# Patient Record
Sex: MALE | Race: WHITE | NOT HISPANIC OR LATINO | Employment: FULL TIME | URBAN - METROPOLITAN AREA
[De-identification: names, ages, dates, MRNs, and addresses within clinical notes are randomized per-mention and may not be internally consistent; named-entity substitution may affect disease eponyms.]

---

## 2017-10-30 ENCOUNTER — OFFICE VISIT (OUTPATIENT)
Dept: MEDICAL GROUP | Facility: MEDICAL CENTER | Age: 57
End: 2017-10-30

## 2017-10-30 VITALS
OXYGEN SATURATION: 97 % | WEIGHT: 201 LBS | SYSTOLIC BLOOD PRESSURE: 130 MMHG | HEIGHT: 69 IN | HEART RATE: 78 BPM | BODY MASS INDEX: 29.77 KG/M2 | TEMPERATURE: 97 F | DIASTOLIC BLOOD PRESSURE: 78 MMHG | RESPIRATION RATE: 16 BRPM

## 2017-10-30 DIAGNOSIS — Z00.00 ANNUAL PHYSICAL EXAM: ICD-10-CM

## 2017-10-30 DIAGNOSIS — A60.01 HERPES SIMPLEX INFECTION OF PENIS: ICD-10-CM

## 2017-10-30 DIAGNOSIS — B00.9 HERPES SIMPLEX DISEASE: ICD-10-CM

## 2017-10-30 DIAGNOSIS — A41.01 MSSA (METHICILLIN SUSCEPTIBLE STAPHYLOCOCCUS AUREUS) SEPTICEMIA (HCC): ICD-10-CM

## 2017-10-30 DIAGNOSIS — Z23 NEED FOR VACCINATION: ICD-10-CM

## 2017-10-30 DIAGNOSIS — Z12.11 SCREEN FOR COLON CANCER: ICD-10-CM

## 2017-10-30 PROCEDURE — 90686 IIV4 VACC NO PRSV 0.5 ML IM: CPT | Performed by: NURSE PRACTITIONER

## 2017-10-30 PROCEDURE — 99396 PREV VISIT EST AGE 40-64: CPT | Mod: 25 | Performed by: NURSE PRACTITIONER

## 2017-10-30 PROCEDURE — 90471 IMMUNIZATION ADMIN: CPT | Performed by: NURSE PRACTITIONER

## 2017-10-30 RX ORDER — VALACYCLOVIR HYDROCHLORIDE 1 G/1
500 TABLET, FILM COATED ORAL
Qty: 90 TAB | Refills: 1 | Status: SHIPPED | OUTPATIENT
Start: 2017-10-30 | End: 2023-02-28

## 2017-10-31 NOTE — PROGRESS NOTES
Ifeanyi Zambrano is a 57 y.o. male here to establish care and for refill on Valtrex.    HPI:    MSSA (methicillin susceptible Staphylococcus aureus) septicemia  In 2013. Has not had any issues since.     Herpes simplex infection of penis  Has not had an outbreak in many years. He takes 500 mg of Valtrex for 7 days when he feels an outbreak coming.     Annual physical exam  Social/Family:   Work:Owns a pest control company  Diet: Meat, Vegetables, tries to stay away from processed foods.   Exercise: Minimal exercise.      Current medicines (including changes today)  Current Outpatient Prescriptions   Medication Sig Dispense Refill   • valacyclovir (VALTREX) 1 GM Tab Take 0.5 Tabs by mouth every day. During outbreaks. 90 Tab 1   • Zoster Vaccine Live (ZOSTAVAX) 30360 UNT/0.65ML Recon Susp Inject 0.65 mL as instructed Once for 1 dose. 0.65 mL 0   • aspirin EC (ECOTRIN) 81 MG TBEC Take 1 Tab by mouth every day.     • Cholecalciferol (VITAMIN D) 2000 UNITS CAPS Take 1 Cap by mouth every day.     • NON SPECIFIED Take 2 Each by mouth every day. Omega 3 Fish Oil (735 EPA/ 165 DHA)       No current facility-administered medications for this visit.      He  has a past medical history of Allergy; Fall; and Heart murmur.  He  has a past surgical history that includes other and hernia repair (1965).  Social History   Substance Use Topics   • Smoking status: Never Smoker   • Smokeless tobacco: Never Used   • Alcohol use Yes      Comment: 3 x beers/week     Social History     Social History Narrative   • No narrative on file     Family History   Problem Relation Age of Onset   • Arthritis Mother    • Hypertension Mother    • Hypertension Father      Family Status   Relation Status   • Mother Alive   • Father Alive   • Sister Alive   • Brother Alive         ROS  No chest pain, no abdominal pain, no rash.  All other systems reviewed and are negative      Objective:     Blood pressure 130/78, pulse 78, temperature 36.1 °C (97 °F),  "resp. rate 16, height 1.753 m (5' 9\"), weight 91.2 kg (201 lb), SpO2 97 %. Body mass index is 29.68 kg/m².  Physical Exam:    Constitutional: Alert, no distress.  Skin: Warm, dry, good turgor, no rashes in visible areas.  Eye: Equal, round and reactive, conjunctiva clear, lids normal.  ENMT: Lips without lesions, good dentition, oropharynx clear.  Neck: Trachea midline, no masses, no thyromegaly. No cervical or supraclavicular lymphadenopathy.  Respiratory: Unlabored respiratory effort, lungs clear to auscultation, no wheezes, no ronchi.  Cardiovascular: Normal S1, S2, no murmur, no edema.  Abdomen: Soft, non-tender, no masses, no hepatosplenomegaly.  Psych: Alert and oriented x3, normal affect and mood.        Assessment and Plan:   The following treatment plan was discussed    1. Annual physical exam  Patient is self pay only, states he cannot afford to get several labs checked. Agreeable to get lipid panel and chemistry panel checked.  Encouraged patient to increase exercise frequency and maintain a healthy diet.   Check labs, call with results.  - LIPID PROFILE; Future  - COMP METABOLIC PANEL; Future    2. Herpes simplex disease  Stable.  Continue Valtrex 500 mg daily for 3 days for outbreak.   - valacyclovir (VALTREX) 1 GM Tab; Take 0.5 Tabs by mouth every day. During outbreaks.  Dispense: 90 Tab; Refill: 1    3. Need for vaccination  Immunized in office.   Prescription for Zoster vaccine given per patient request.   - Zoster Vaccine Live (ZOSTAVAX) 85834 UNT/0.65ML Recon Susp; Inject 0.65 mL as instructed Once for 1 dose.  Dispense: 0.65 mL; Refill: 0  -Influenza Vaccine      Records requested.  Followup: Return in about 1 year (around 10/30/2018), or if symptoms worsen or fail to improve.    I have placed the below orders and discussed them with an approved delegating provider. The MA is performing the below orders under the direction of Dr. Carrera           "

## 2017-10-31 NOTE — ASSESSMENT & PLAN NOTE
Has not had an outbreak in many years. He takes 500 mg of Valtrex for 7 days when he feels an outbreak coming.

## 2017-10-31 NOTE — ASSESSMENT & PLAN NOTE
Social/Family:   Work:Owns a pest control company  Diet: Meat, Vegetables, tries to stay away from processed foods.   Exercise: Minimal exercise.

## 2023-01-09 ENCOUNTER — TELEPHONE (OUTPATIENT)
Dept: SCHEDULING | Facility: IMAGING CENTER | Age: 63
End: 2023-01-09

## 2023-02-13 ENCOUNTER — OFFICE VISIT (OUTPATIENT)
Dept: MEDICAL GROUP | Facility: PHYSICIAN GROUP | Age: 63
End: 2023-02-13
Payer: MEDICARE

## 2023-02-13 VITALS
WEIGHT: 200 LBS | RESPIRATION RATE: 16 BRPM | SYSTOLIC BLOOD PRESSURE: 138 MMHG | TEMPERATURE: 97.9 F | HEIGHT: 69 IN | DIASTOLIC BLOOD PRESSURE: 82 MMHG | OXYGEN SATURATION: 98 % | HEART RATE: 67 BPM | BODY MASS INDEX: 29.62 KG/M2

## 2023-02-13 DIAGNOSIS — Z12.12 SCREENING FOR COLORECTAL CANCER: ICD-10-CM

## 2023-02-13 DIAGNOSIS — M65.332 ACQUIRED TRIGGER FINGER OF LEFT MIDDLE FINGER: ICD-10-CM

## 2023-02-13 DIAGNOSIS — Z12.5 ENCOUNTER FOR SCREENING FOR MALIGNANT NEOPLASM OF PROSTATE: ICD-10-CM

## 2023-02-13 DIAGNOSIS — Z23 NEED FOR VACCINATION: ICD-10-CM

## 2023-02-13 DIAGNOSIS — Z12.11 SCREENING FOR COLORECTAL CANCER: ICD-10-CM

## 2023-02-13 DIAGNOSIS — Z00.00 WELL ADULT EXAM: ICD-10-CM

## 2023-02-13 DIAGNOSIS — M67.911 DISORDER OF RIGHT ROTATOR CUFF: ICD-10-CM

## 2023-02-13 PROCEDURE — G0009 ADMIN PNEUMOCOCCAL VACCINE: HCPCS | Performed by: FAMILY MEDICINE

## 2023-02-13 PROCEDURE — 99203 OFFICE O/P NEW LOW 30 MIN: CPT | Mod: 25 | Performed by: FAMILY MEDICINE

## 2023-02-13 PROCEDURE — 90677 PCV20 VACCINE IM: CPT | Performed by: FAMILY MEDICINE

## 2023-02-13 ASSESSMENT — ENCOUNTER SYMPTOMS
HEARTBURN: 0
RESPIRATORY NEGATIVE: 1
HEMOPTYSIS: 0
FEVER: 0
COUGH: 0
CHILLS: 0
GASTROINTESTINAL NEGATIVE: 1
PSYCHIATRIC NEGATIVE: 1
CARDIOVASCULAR NEGATIVE: 1
HEADACHES: 0
DEPRESSION: 0
MYALGIAS: 0
NEUROLOGICAL NEGATIVE: 1
BLURRED VISION: 0
PALPITATIONS: 0
DOUBLE VISION: 0
TINGLING: 0
BRUISES/BLEEDS EASILY: 0
NAUSEA: 0
CONSTITUTIONAL NEGATIVE: 1
EYES NEGATIVE: 1
DIZZINESS: 0

## 2023-02-13 ASSESSMENT — PATIENT HEALTH QUESTIONNAIRE - PHQ9: CLINICAL INTERPRETATION OF PHQ2 SCORE: 0

## 2023-02-13 NOTE — PROGRESS NOTES
Subjective     Mario Zambrano is a 63 y.o. male who presents with Establish Care (Arthritis )            1. Well adult exam  This patient was here for a preventative medicine visit today. The Health Maintenance issues that are appropriate for this patient's age and sex were discussed and any that they agreed to were ordered. The patient was also give age and sex appropriate counseling for preventative matters such as diet, exercise, medication usage, and social determinants.       Referral to GI for Colonoscopy   Comp Metabolic Panel; Future   Lipid Profile; Future   CBC WITHOUT DIFFERENTIAL; Future   PROSTATE SPECIFIC AG SCREENING; Future    2. Need for vaccination     Pneumococcal Conjugate Vaccine 20-Valent (19 yrs+)    3. Screening for colorectal cancer      4. Encounter for screening for malignant neoplasm of prostate     PROSTATE SPECIFIC AG SCREENING; Future    5. Disorder of right rotator cuff  Fall and pain and lack of rom in the right shoulder, on exam 90 degrees of abduction before pain   Referral to Orthopedics    6. Acquired trigger finger of left middle finger  Left middle finger with locking and popping   Referral to Orthopedics    Past Medical History:  No date: Allergy  No date: Fall  No date: Heart murmur  Past Surgical History:  1965: HERNIA REPAIR  No date: OTHER  Social History    Tobacco Use      Smoking status: Never      Smokeless tobacco: Never    Alcohol use: Yes      Comment: 3 x beers/week    Drug use: No    Review of patient's family history indicates:      Current Outpatient Medications: ·  Apoaequorin (PREVAGEN PO), Take  by mouth., Disp: , Rfl: ·  NON SPECIFIED, Take 2 Each by mouth every day. Omega 3 Fish Oil (735 EPA/ 165 DHA), Disp: , Rfl: ·  meloxicam (MOBIC) 15 MG tablet, Take 1 Tablet by mouth every day. (Patient not taking: Reported on 2/13/2023), Disp: 30 Tablet, Rfl: 0·  valacyclovir (VALTREX) 1 GM Tab, Take 0.5 Tabs by mouth every day. During outbreaks. (Patient not  "taking: Reported on 2/13/2023), Disp: 90 Tab, Rfl: 1·  aspirin EC (ECOTRIN) 81 MG TBEC, Take 1 Tab by mouth every day. (Patient not taking: Reported on 2/13/2023), Disp: , Rfl: ·  Cholecalciferol (VITAMIN D) 2000 UNITS CAPS, Take 1 Cap by mouth every day. (Patient not taking: Reported on 2/13/2023), Disp: , Rfl:     Patient was instructed on the use of medications, either prescriptions or OTC and informed on when the appropriate follow up time period should be. In addition, patient was also instructed that should any acute worsening occur that they should notify this clinic asap or call 911.            Review of Systems   Constitutional: Negative.  Negative for chills and fever.   HENT: Negative.  Negative for hearing loss.    Eyes: Negative.  Negative for blurred vision and double vision.   Respiratory: Negative.  Negative for cough and hemoptysis.    Cardiovascular: Negative.  Negative for chest pain and palpitations.   Gastrointestinal: Negative.  Negative for heartburn and nausea.   Genitourinary: Negative.  Negative for dysuria.   Musculoskeletal:  Positive for joint pain. Negative for myalgias.   Skin: Negative.  Negative for rash.   Neurological: Negative.  Negative for dizziness, tingling and headaches.   Endo/Heme/Allergies: Negative.  Does not bruise/bleed easily.   Psychiatric/Behavioral: Negative.  Negative for depression and suicidal ideas.    All other systems reviewed and are negative.           Objective     /82 (BP Location: Right arm, Patient Position: Sitting, BP Cuff Size: Adult)   Pulse 67   Temp 36.6 °C (97.9 °F) (Temporal)   Resp 16   Ht 1.753 m (5' 9\")   Wt 90.7 kg (200 lb)   SpO2 98%   BMI 29.53 kg/m²      Physical Exam  Vitals and nursing note reviewed.   Constitutional:       General: He is not in acute distress.     Appearance: He is well-developed. He is not diaphoretic.   HENT:      Head: Normocephalic and atraumatic.      Mouth/Throat:      Pharynx: No oropharyngeal " exudate.   Eyes:      Pupils: Pupils are equal, round, and reactive to light.   Cardiovascular:      Rate and Rhythm: Normal rate and regular rhythm.      Heart sounds: Normal heart sounds. No murmur heard.    No friction rub. No gallop.   Pulmonary:      Effort: Pulmonary effort is normal. No respiratory distress.      Breath sounds: Normal breath sounds. No wheezing or rales.   Chest:      Chest wall: No tenderness.   Musculoskeletal:      Right shoulder: Decreased range of motion.      Left hand: Tenderness present. Decreased range of motion.   Neurological:      Mental Status: He is alert and oriented to person, place, and time.   Psychiatric:         Behavior: Behavior normal.         Thought Content: Thought content normal.         Judgment: Judgment normal.                           Assessment & Plan        1. Well adult exam    - Referral to GI for Colonoscopy  - Comp Metabolic Panel; Future  - Lipid Profile; Future  - CBC WITHOUT DIFFERENTIAL; Future  - PROSTATE SPECIFIC AG SCREENING; Future    2. Need for vaccination    - Pneumococcal Conjugate Vaccine 20-Valent (19 yrs+)    3. Screening for colorectal cancer      4. Encounter for screening for malignant neoplasm of prostate    - PROSTATE SPECIFIC AG SCREENING; Future    5. Disorder of right rotator cuff    - Referral to Orthopedics    6. Acquired trigger finger of left middle finger    - Referral to Orthopedics

## 2023-02-28 ENCOUNTER — TELEPHONE (OUTPATIENT)
Dept: HEALTH INFORMATION MANAGEMENT | Facility: OTHER | Age: 63
End: 2023-02-28
Payer: MEDICARE

## 2023-02-28 PROBLEM — M75.41 IMPINGEMENT SYNDROME OF RIGHT SHOULDER: Status: ACTIVE | Noted: 2023-02-28

## 2023-02-28 PROBLEM — M75.21 BICIPITAL TENDINITIS OF RIGHT SHOULDER: Status: ACTIVE | Noted: 2023-02-28

## 2023-02-28 PROBLEM — S46.011A STRAIN OF TENDON OF RIGHT ROTATOR CUFF: Status: ACTIVE | Noted: 2023-02-28

## 2023-10-11 ENCOUNTER — OFFICE VISIT (OUTPATIENT)
Dept: MEDICAL GROUP | Facility: PHYSICIAN GROUP | Age: 63
End: 2023-10-11
Payer: MEDICARE

## 2023-10-11 VITALS
WEIGHT: 196 LBS | TEMPERATURE: 98.4 F | SYSTOLIC BLOOD PRESSURE: 122 MMHG | RESPIRATION RATE: 18 BRPM | HEART RATE: 92 BPM | OXYGEN SATURATION: 93 % | HEIGHT: 70 IN | DIASTOLIC BLOOD PRESSURE: 80 MMHG | BODY MASS INDEX: 28.06 KG/M2

## 2023-10-11 DIAGNOSIS — R05.9 COUGH, UNSPECIFIED TYPE: Primary | ICD-10-CM

## 2023-10-11 PROCEDURE — 99999 PR NO CHARGE: CPT | Performed by: FAMILY MEDICINE

## 2023-10-18 ENCOUNTER — OFFICE VISIT (OUTPATIENT)
Dept: MEDICAL GROUP | Facility: PHYSICIAN GROUP | Age: 63
End: 2023-10-18
Payer: MEDICARE

## 2023-10-18 VITALS
SYSTOLIC BLOOD PRESSURE: 134 MMHG | RESPIRATION RATE: 16 BRPM | HEART RATE: 70 BPM | HEIGHT: 70 IN | DIASTOLIC BLOOD PRESSURE: 80 MMHG | OXYGEN SATURATION: 96 % | TEMPERATURE: 96.6 F | BODY MASS INDEX: 27.49 KG/M2 | WEIGHT: 192 LBS

## 2023-10-18 DIAGNOSIS — E78.2 MIXED HYPERLIPIDEMIA: ICD-10-CM

## 2023-10-18 DIAGNOSIS — N63.42 SUBAREOLAR MASS OF LEFT BREAST: ICD-10-CM

## 2023-10-18 DIAGNOSIS — R73.09 ELEVATED GLUCOSE: ICD-10-CM

## 2023-10-18 DIAGNOSIS — Z12.11 SCREENING FOR COLON CANCER: ICD-10-CM

## 2023-10-18 PROCEDURE — 3079F DIAST BP 80-89 MM HG: CPT | Performed by: PHYSICIAN ASSISTANT

## 2023-10-18 PROCEDURE — 3075F SYST BP GE 130 - 139MM HG: CPT | Performed by: PHYSICIAN ASSISTANT

## 2023-10-18 PROCEDURE — 99214 OFFICE O/P EST MOD 30 MIN: CPT | Performed by: PHYSICIAN ASSISTANT

## 2023-10-18 NOTE — PROGRESS NOTES
CC:    Chief Complaint   Patient presents with    [facility]    Cyst     Left side        HISTORY OF THE PRESENT ILLNESS: Patient is a 63 y.o. male presenting to establish primary care     Pt has bump over his L nipple that started about a month ago.   Pt has several orthopedic conditions and is followed by [facility].     No problem-specific Assessment & Plan notes found for this encounter.    Allergies: Pcn [penicillins]    Current Outpatient Medications Ordered in Epic   Medication Sig Dispense Refill    ondansetron (ZOFRAN) 4 MG Tab tablet Take 1 Tablet by mouth every four hours as needed for Nausea/Vomiting. (Patient not taking: Reported on 10/11/2023) 15 Tablet 0    Apoaequorin (PREVAGEN PO) Take  by mouth. (Patient not taking: Reported on 10/18/2023)      NON SPECIFIED Take 2 Each by mouth every day. Omega 3 Fish Oil (735 EPA/ 165 DHA) (Patient not taking: Reported on 10/11/2023)       No current [facility]-ordered facility-administered medications on file.       Past Medical History:   Diagnosis Date    Allergy     Fall     Heart murmur        Past Surgical History:   Procedure Laterality Date    PB SHLDR ARTHROSCOP,SURG,W/ROTAT CUFF REPB Right 3/20/2023    Procedure: RIGHT SHOULDER ARTHROSCOPY ROTATOR CUFF REPAIR RIGHT SUBACROMIAL DECOMPRESSION RIGHT EXTENSIVE JOINT DEBRIDEMENT RIGHT BICEP TENODESIS, REPAIRS AS INDICATED;  Surgeon: Kt Del Toro M.D.;  Location: [facility];  Service: Orthopedics    HERNIA REPAIR  1965    OTHER         Social History     Tobacco Use    Smoking status: Never    Smokeless tobacco: Never   Vaping Use    Vaping Use: Never used   Substance Use Topics    Alcohol use: Yes     Comment: 3 x beers/week    Drug use: No       Social History     Social History Narrative    Works in pest control       Family History   Problem Relation Age of Onset    Arthritis Mother     Hypertension Mother     Hypertension Father        ROS:     - Constitutional: Negative for fever, chills,  "unexpected weight change, and fatigue/generalized weakness.     - HEENT: Negative for headaches, vision changes, hearing changes, ear pain, ear discharge, rhinorrhea, sinus congestion, sore throat, and neck pain.      - Respiratory: Negative for cough, sputum production, chest congestion, dyspnea, wheezing, and crackles.      - Cardiovascular: Negative for chest pain, palpitations, orthopnea, and bilateral lower extremity edema.     - Gastrointestinal: Negative for heartburn, nausea, vomiting, abdominal pain, hematochezia, melena, diarrhea, constipation, and greasy/foul-smelling stools.     - Genitourinary: Negative for dysuria, polyuria, hematuria, pyuria, urinary urgency, and urinary incontinence.     - Musculoskeletal: Negative for myalgias, back pain, and joint pain.     - Skin: Negative for rash, itching, cyanotic skin color change.     - Neurological: Negative for dizziness, tingling, tremors, focal sensory deficit, focal weakness and headaches.     - Endo/Heme/Allergies: Does not bruise/bleed easily.     - Psychiatric/Behavioral: Negative for depression, suicidal/homicidal ideation and memory loss.            .      Exam: /80 (BP Location: Left arm, Patient Position: Sitting, BP Cuff Size: Adult)   Pulse 70   Temp 35.9 °C (96.6 °F) (Temporal)   Resp 16   Ht 1.778 m (5' 10\")   Wt 87.1 kg (192 lb)   SpO2 96%  Body mass index is 27.55 kg/m².    General: Normal appearing. No acute distress.  Skin: Warm and dry.  No obvious lesions.  HEENT: Normocephalic. Eyes conjunctiva clear lids without ptosis, ears normal shape and contour  Breast: Positive for 3cm L subareolar mass in L breast  Cardiovascular: Regular rate and rhythm without murmur.   Respiratory: Clear to auscultation bilaterally, no rhonchi wheezing or rales.  Neurologic: Grossly nonfocal, A&O x3, gait normal,  Musculoskeletal: No deformity or swelling.   Extremities: No extremity cyanosis, clubbing, or edema.  Psych: Normal mood and affect. " Judgment and insight is normal.    Please note that this dictation was created using voice recognition software. I have made every reasonable attempt to correct obvious errors, but I expect that there are errors of grammar and possibly content that I did not discover before finalizing the note.      Assessment/Plan  1. Screening for colon cancer    - COLOGUARD (FIT DNA)    2. Subareolar mass of left breast  Will get imaging and f/u with results. Pt would like to see surgeon to have removed.   - MA-DIAGNOSTIC MAMMO BILAT W/TOMOSYNTHESIS W/CAD; Future  - US-BREAST LIMITED-LEFT; Future  - Referral to General Surgery    3. Mixed hyperlipidemia  Labs printed  - CBC WITH DIFFERENTIAL; Future  - Comp Metabolic Panel; Future  - Lipid Profile; Future    4. Elevated glucose    - HEMOGLOBIN A1C; Future

## 2023-10-27 LAB — HBA1C MFR BLD: 5.7 % (ref ?–5.8)

## 2023-10-30 ENCOUNTER — HOSPITAL ENCOUNTER (OUTPATIENT)
Dept: RADIOLOGY | Facility: MEDICAL CENTER | Age: 63
End: 2023-10-30
Attending: PHYSICIAN ASSISTANT
Payer: MEDICARE

## 2023-10-30 DIAGNOSIS — N63.42 SUBAREOLAR MASS OF LEFT BREAST: ICD-10-CM

## 2023-10-30 PROCEDURE — 76642 ULTRASOUND BREAST LIMITED: CPT | Mod: LT

## 2023-10-30 PROCEDURE — G0279 TOMOSYNTHESIS, MAMMO: HCPCS

## 2023-11-02 ENCOUNTER — OFFICE VISIT (OUTPATIENT)
Dept: SURGERY | Facility: MEDICAL CENTER | Age: 63
End: 2023-11-02
Payer: MEDICARE

## 2023-11-02 VITALS
HEIGHT: 70 IN | SYSTOLIC BLOOD PRESSURE: 124 MMHG | BODY MASS INDEX: 28.49 KG/M2 | DIASTOLIC BLOOD PRESSURE: 80 MMHG | WEIGHT: 199 LBS | HEART RATE: 73 BPM | OXYGEN SATURATION: 94 % | TEMPERATURE: 98.4 F

## 2023-11-02 DIAGNOSIS — N62 GYNECOMASTIA: ICD-10-CM

## 2023-11-02 PROCEDURE — 3079F DIAST BP 80-89 MM HG: CPT | Performed by: SURGERY

## 2023-11-02 PROCEDURE — 99204 OFFICE O/P NEW MOD 45 MIN: CPT | Performed by: SURGERY

## 2023-11-02 PROCEDURE — 3074F SYST BP LT 130 MM HG: CPT | Performed by: SURGERY

## 2023-11-02 ASSESSMENT — ENCOUNTER SYMPTOMS
ENDOCRINE NEGATIVE: 1
MUSCULOSKELETAL NEGATIVE: 1
NEUROLOGICAL NEGATIVE: 1
HEMATOLOGIC/LYMPHATIC NEGATIVE: 1
GASTROINTESTINAL NEGATIVE: 1
RESPIRATORY NEGATIVE: 1
EYES NEGATIVE: 1
PSYCHIATRIC NEGATIVE: 1
CONSTITUTIONAL NEGATIVE: 1
CARDIOVASCULAR NEGATIVE: 1

## 2023-11-02 NOTE — PROGRESS NOTES
"Subjective     Mario Zambrano is a 63 y.o. male who presents for evaluation of left breast gynecomastia.  He reports that he started taking an over the counter supplement for \"testosterone boosting\" in August of 2023 and within 3 weeks noticed a lump behind the left nipple associated with \"perpetual nipple hardness.\"  The mass seems to wax and wane in size and firmness but is still present.  He hasn't taken the supplements for about 2-3 weeks now but the mass is still present.  He has not noticed any other masses including testicular masses.  The breast mass is tender when it is pressed upon (like when leaning over the side of his truck bed, etc).  The supplement contains: Fadogia Agrestis, Tongkat Ali, Turkesterone, horny goat weed, saw palmetto, Meca root, and trubilus terrastris, among others.    Routine self breast exams: No   Breast pain: Yes  Nipple discharge: No   Skin changes: No   Masses: Yes  Contour/nipple changes: Yes  Previous breast biopsy or surgery: No     Family history of cancer: Mother skin cancers (high radiation exposure as teenager at [place] nuclear test sites).    Lifetime (5-yr) breast cancer risk assessment calculation  BRCAPRO: 0.1 % (0.0 %)    Imaging  Bilateral diagnostic mammogram ([facility]) 10/30/2023:  Bilateral retroareolar parenchymal tissue, left greater than right, with no suspicious findings, BIRADS 2.  Diagnostic ultrasound ([facility]) 10/30/2023:  Bilateral retroareolar parenchymal tissue, left greater than right, with no suspicious findings, BIRADS 2.     Past Medical History   Past Medical History:   Diagnosis Date    Acute osteomyelitis of pelvic (pubic) region 8/21/2013    Allergy     Fall     Heart murmur     MSSA (methicillin susceptible Staphylococcus aureus) septicemia (HCC) 8/21/2013    IMO load March 2020       Surgical History  Past Surgical History:   Procedure Laterality Date    PB SHLDR ARTHROSCOP,SURG,W/ROTAT CUFF REPB Right 3/20/2023    Procedure: RIGHT SHOULDER " ARTHROSCOPY ROTATOR CUFF REPAIR RIGHT SUBACROMIAL DECOMPRESSION RIGHT EXTENSIVE JOINT DEBRIDEMENT RIGHT BICEP TENODESIS, REPAIRS AS INDICATED;  Surgeon: Kt Del Toro M.D.;  Location: [facility];  Service: Orthopedics    HERNIA REPAIR  1965    OTHER         Family History  Family History   Problem Relation Age of Onset    Arthritis Mother     Hypertension Mother     Hypertension Father        Social History  Social History     Socioeconomic History    Marital status:      Spouse name: Not on file    Number of children: Not on file    Years of education: Not on file    Highest education level: Not on file   Occupational History    Occupation: business owner pest control   Tobacco Use    Smoking status: Never    Smokeless tobacco: Never   Vaping Use    Vaping Use: Never used   Substance and Sexual Activity    Alcohol use: Yes     Comment: 3 drinks/ week    Drug use: No    Sexual activity: Yes     Partners: Female   Other Topics Concern    Not on file   Social History Narrative    Works in pest control     Social Determinants of Health     Financial Resource Strain: Not on file   Food Insecurity: Not on file   Transportation Needs: Not on file   Physical Activity: Not on file   Stress: Not on file   Social Connections: Not on file   Intimate Partner Violence: Not on file   Housing Stability: Not on file        Review of Systems  Review of Systems   Constitutional: Negative.    HENT:  Negative.     Eyes: Negative.    Respiratory: Negative.     Cardiovascular: Negative.    Gastrointestinal: Negative.    Endocrine: Negative.    Genitourinary: Negative.     Musculoskeletal: Negative.    Skin: Negative.    Neurological: Negative.    Hematological: Negative.    Psychiatric/Behavioral: Negative.     All other systems reviewed and are negative.       Objective   /80 (BP Location: Left arm, Patient Position: Sitting, BP Cuff Size: Large adult)   Pulse 73   Temp 36.9 °C (98.4 °F) (Temporal)    "Ht 1.778 m (5' 10\")   Wt 90.3 kg (199 lb)   SpO2 94%   BMI 28.55 kg/m²    Physical Exam  Vitals and nursing note reviewed.   Constitutional:       General: He is not in acute distress.     Appearance: Normal appearance.   HENT:      Head: Normocephalic and atraumatic.      Right Ear: External ear normal.      Left Ear: External ear normal.      Nose: Nose normal.      Mouth/Throat:      Pharynx: Oropharynx is clear.   Eyes:      General: No scleral icterus.     Conjunctiva/sclera: Conjunctivae normal.   Cardiovascular:      Rate and Rhythm: Normal rate and regular rhythm.      Heart sounds: Normal heart sounds. No murmur heard.  Pulmonary:      Effort: Pulmonary effort is normal.      Breath sounds: Normal breath sounds. No wheezing, rhonchi or rales.   Chest:   Breasts:     Breasts are asymmetrical.      Right: Normal. No swelling, bleeding, inverted nipple, mass, nipple discharge or skin change.      Left: Swelling and mass present. No bleeding, inverted nipple, nipple discharge or skin change.          Comments: Bilateral breasts examined in the upright and supine positions.  No suspicious skin changes (erythema, peau d'orange).  Left breast contour more prominent than on the right, particularly with arms raised.  Bilateral breast tissue moderately dense with dominant firm prominence spanning ~3cm centered under the left nipple.  Bilateral nipples everted without expressible discharge.  No palpable cervical, supraclavicular, or axillary adenopathy bilaterally.  Abdominal:      General: Abdomen is flat. There is no distension.      Palpations: Abdomen is soft. There is no mass.   Musculoskeletal:         General: No swelling or deformity.      Cervical back: Neck supple.   Lymphadenopathy:      Cervical: No cervical adenopathy.      Upper Body:      Right upper body: No supraclavicular or axillary adenopathy.      Left upper body: No supraclavicular or axillary adenopathy.   Skin:     General: Skin is warm and " "dry.      Capillary Refill: Capillary refill takes less than 2 seconds.   Neurological:      General: No focal deficit present.      Mental Status: He is alert and oriented to person, place, and time.   Psychiatric:         Mood and Affect: Mood normal.         Behavior: Behavior normal.         Thought Content: Thought content normal.         Judgment: Judgment normal.         Assessment & Plan   The patient is a delightful 63 y.o. male with new onset left breast gynecomastia in the setting of taking an over the counter herbal supplement mix.  I suspect that with time this will likely recede since he has stopped the supplement.  We discussed the natural history of gynecomastia, and that I currently am not concerned for malignancy.  I would like to see him back in 6 months with a repeat ultrasound to reassess.  We did discuss mastectomy for gynecomastia, but when I discussed the surgical risk profile (nipple inversion, nipple necrosis, infection, bleeding, asymmetry, etc) he agrees with watchful waiting for now.  He will call with any changes or worsening and we will reassess at that time.  Otherwise, I'll see him back in 6 months with repeat limited left ultrasound.  All questions answered in detail.  A total of 45 minutes were spent on and with this patient today, including review of records, independent review of imaging, history and physical exam, counseling, documentation of exam, and coordination of care.      Problem   Gynecomastia    Noticed September 2023 after taking OTC herbal \"testosterone-boosting\" supplements.  - Diagnostic imaging benign gynecomastia 10/30/2023  - Planning 6mo f/u ultrasound after stopping supplements     MSSA (methicillin susceptible Staphylococcus aureus) septicemia (HCC) (Resolved)    IMO load March 2020     Acute osteomyelitis of pelvic (pubic) region (Resolved)       "

## 2024-02-20 ENCOUNTER — APPOINTMENT (OUTPATIENT)
Dept: MEDICAL GROUP | Facility: PHYSICIAN GROUP | Age: 64
End: 2024-02-20
Payer: MEDICARE

## 2024-02-20 SDOH — ECONOMIC STABILITY: TRANSPORTATION INSECURITY
IN THE PAST 12 MONTHS, HAS LACK OF TRANSPORTATION KEPT YOU FROM MEETINGS, WORK, OR FROM GETTING THINGS NEEDED FOR DAILY LIVING?: PATIENT DECLINED

## 2024-02-20 SDOH — ECONOMIC STABILITY: HOUSING INSECURITY
IN THE LAST 12 MONTHS, WAS THERE A TIME WHEN YOU DID NOT HAVE A STEADY PLACE TO SLEEP OR SLEPT IN A SHELTER (INCLUDING NOW)?: PATIENT DECLINED

## 2024-02-20 SDOH — ECONOMIC STABILITY: TRANSPORTATION INSECURITY
IN THE PAST 12 MONTHS, HAS LACK OF RELIABLE TRANSPORTATION KEPT YOU FROM MEDICAL APPOINTMENTS, MEETINGS, WORK OR FROM GETTING THINGS NEEDED FOR DAILY LIVING?: PATIENT DECLINED

## 2024-02-20 SDOH — HEALTH STABILITY: MENTAL HEALTH

## 2024-02-20 SDOH — ECONOMIC STABILITY: TRANSPORTATION INSECURITY

## 2024-02-20 SDOH — ECONOMIC STABILITY: INCOME INSECURITY: IN THE LAST 12 MONTHS, WAS THERE A TIME WHEN YOU WERE NOT ABLE TO PAY THE MORTGAGE OR RENT ON TIME?: PATIENT DECLINED

## 2024-02-20 SDOH — HEALTH STABILITY: PHYSICAL HEALTH
ON AVERAGE, HOW MANY DAYS PER WEEK DO YOU ENGAGE IN MODERATE TO STRENUOUS EXERCISE (LIKE A BRISK WALK)?: PATIENT DECLINED

## 2024-02-20 SDOH — HEALTH STABILITY: PHYSICAL HEALTH: ON AVERAGE, HOW MANY MINUTES DO YOU ENGAGE IN EXERCISE AT THIS LEVEL?: PATIENT DECLINED

## 2024-02-20 SDOH — ECONOMIC STABILITY: HOUSING INSECURITY

## 2024-02-20 ASSESSMENT — SOCIAL DETERMINANTS OF HEALTH (SDOH)
DO YOU BELONG TO ANY CLUBS OR ORGANIZATIONS SUCH AS CHURCH GROUPS UNIONS, FRATERNAL OR ATHLETIC GROUPS, OR SCHOOL GROUPS?: PATIENT DECLINED
DO YOU BELONG TO ANY CLUBS OR ORGANIZATIONS SUCH AS CHURCH GROUPS UNIONS, FRATERNAL OR ATHLETIC GROUPS, OR SCHOOL GROUPS?: PATIENT DECLINED
ARE YOU MARRIED, WIDOWED, DIVORCED, SEPARATED, NEVER MARRIED, OR LIVING WITH A PARTNER?: PATIENT DECLINED
ARE YOU MARRIED, WIDOWED, DIVORCED, SEPARATED, NEVER MARRIED, OR LIVING WITH A PARTNER?: PATIENT DECLINED

## 2024-03-22 ENCOUNTER — TELEPHONE (OUTPATIENT)
Dept: HEALTH INFORMATION MANAGEMENT | Facility: OTHER | Age: 64
End: 2024-03-22

## 2025-02-11 ENCOUNTER — OFFICE VISIT (OUTPATIENT)
Dept: MEDICAL GROUP | Facility: PHYSICIAN GROUP | Age: 65
End: 2025-02-11
Payer: MEDICARE

## 2025-02-11 VITALS
RESPIRATION RATE: 12 BRPM | SYSTOLIC BLOOD PRESSURE: 116 MMHG | DIASTOLIC BLOOD PRESSURE: 68 MMHG | HEIGHT: 70 IN | BODY MASS INDEX: 27.92 KG/M2 | HEART RATE: 71 BPM | OXYGEN SATURATION: 95 % | WEIGHT: 195 LBS | TEMPERATURE: 98.8 F

## 2025-02-11 DIAGNOSIS — M19.90 ARTHRITIS: ICD-10-CM

## 2025-02-11 DIAGNOSIS — I49.9 IRREGULAR HEART RHYTHM: ICD-10-CM

## 2025-02-11 DIAGNOSIS — M65.332 TRIGGER MIDDLE FINGER OF LEFT HAND: ICD-10-CM

## 2025-02-11 PROCEDURE — 99214 OFFICE O/P EST MOD 30 MIN: CPT | Performed by: PHYSICIAN ASSISTANT

## 2025-02-11 PROCEDURE — 3078F DIAST BP <80 MM HG: CPT | Performed by: PHYSICIAN ASSISTANT

## 2025-02-11 PROCEDURE — 3074F SYST BP LT 130 MM HG: CPT | Performed by: PHYSICIAN ASSISTANT

## 2025-02-11 RX ORDER — DICLOFENAC SODIUM AND MISOPROSTOL 50; 200 MG/1; UG/1
1 TABLET, DELAYED RELEASE ORAL 2 TIMES DAILY
Qty: 180 TABLET | Refills: 0 | Status: SHIPPED | OUTPATIENT
Start: 2025-02-11

## 2025-02-11 NOTE — PROGRESS NOTES
"CC:  Chief Complaint   Patient presents with    Referral Needed     For arthritis     Follow-Up     Head pressure        HISTORY OF PRESENT ILLNESS: Patient is a 65 y.o. male established patient presenting with issues below  Pt having irregular heart rhythms. Has been happening more in recent weeks and happens over and over. No CP. States that he knows its the same thing that his mother has, which is afib.   Pt requesting to start on Arthrotec for osteoarthritis.   Needing referral to hand specialist for his L hand 3rd finger trigger finger.    Pt having lightheadedness. States its like a pressure in his central forehead. Occurs in the mornings and lasts for about 20 minutes. Happening for past 3-4 weeks every day.     Current Outpatient Medications   Medication Sig Dispense Refill    azithromycin (ZITHROMAX) 250 MG Tab TAKE 2 TABLETS BY MOUTH ON DAY 1, AND THEN TAKE 1 TABLET BY MOUTH ONCE A DAY ON DAY 2 THROUGH DAY 5 (Patient not taking: Reported on 2/11/2025)      Apoaequorin (PREVAGEN) 10 MG Cap Take  by mouth. (Patient not taking: Reported on 2/11/2025)       No current facility-administered medications for this visit.        ROS:     ROS    Exam:    /68   Pulse 71   Temp 37.1 °C (98.8 °F) (Temporal)   Resp 12   Ht 1.778 m (5' 10\")   Wt 88.5 kg (195 lb)   SpO2 95%  Body mass index is 27.98 kg/m².    General:  Well nourished, well developed male in NAD  Head is grossly normal.  Neck: Supple.   Pulmonary: Clear to auscultation. No ronchi, wheezing or rales  Cardiac: Regular rate and rhythm. No murmurs.  Skin: Warm and dry. No obvious lesions  Neuro: Normal muscle tone. Gait normal. Alert and oriented.  Psych: Normal mood and affect      Please note that this dictation was created using voice recognition software. I have made every reasonable attempt to correct obvious errors, but I expect that there are errors of grammar and possibly content that I did not discover before finalizing the " note.        Assessment/Plan:    1. Trigger middle finger of left hand  Hand surgery referral placed  - Referral to Hand Surgery    2. Irregular heart rhythm  Will get monitor and review when available  - Cardiac Event Monitor; Future    3. Arthritis  Rx for arthrotec sent in  - diclofenac-misoprostol (ARTHROTEC 50) 50-0.2 MG per tablet; Take 1 Tablet by mouth 2 times a day.  Dispense: 180 Tablet; Refill: 0

## 2025-02-13 ENCOUNTER — TELEPHONE (OUTPATIENT)
Dept: MEDICAL GROUP | Facility: PHYSICIAN GROUP | Age: 65
End: 2025-02-13
Payer: MEDICARE

## 2025-02-13 NOTE — TELEPHONE ENCOUNTER
Patient called he is if you could send it to separately prescriptions for diclofenac and misoprostol? Since medicare will not cover diclofenac-misoprostol (ARTHROTEC 50)

## 2025-02-14 NOTE — Clinical Note
REFERRAL APPROVAL NOTICE         Sent on February 14, 2025                   Mario Zambrano  [address] [facility] [address]                   Dear Mr. Zambrano,    After a careful review of the medical information and benefit coverage, [facility] has processed your referral. See below for additional details.    If applicable, you must be actively enrolled with your insurance for coverage of the authorized service. If you have any questions regarding your coverage, please contact your insurance directly.    REFERRAL INFORMATION   Referral #:  [id number]  Referred-To Department    Referred-By Provider:  Hand Surgery    Lionel Arcos P.A.-C.   [facility]      [address] [id number] [phone] [address] [facility] [address]  [phone]    Referral Start Date:  02/11/2025  Referral End Date:   02/11/2026             SCHEDULING  If you do not already have an appointment, please call [phone] to make an appointment.     MORE INFORMATION  If you do not already have a [employer] account, sign up at: [email or web address]  You can access your medical information, make appointments, see lab results, billing information, and more.  If you have questions regarding this referral, please contact  the [facility] Referrals department at:             [phone]. Monday - Friday 8:00AM - 5:00PM.     Sincerely,    [facility]

## 2025-02-18 ENCOUNTER — TELEPHONE (OUTPATIENT)
Dept: HEALTH INFORMATION MANAGEMENT | Facility: OTHER | Age: 65
End: 2025-02-18
Payer: MEDICARE